# Patient Record
Sex: FEMALE | Race: WHITE | NOT HISPANIC OR LATINO
[De-identification: names, ages, dates, MRNs, and addresses within clinical notes are randomized per-mention and may not be internally consistent; named-entity substitution may affect disease eponyms.]

---

## 2024-04-04 ENCOUNTER — APPOINTMENT (OUTPATIENT)
Dept: GASTROENTEROLOGY | Facility: CLINIC | Age: 79
End: 2024-04-04
Payer: MEDICARE

## 2024-04-04 VITALS
WEIGHT: 138 LBS | HEART RATE: 69 BPM | HEIGHT: 67 IN | OXYGEN SATURATION: 97 % | SYSTOLIC BLOOD PRESSURE: 130 MMHG | DIASTOLIC BLOOD PRESSURE: 75 MMHG | BODY MASS INDEX: 21.66 KG/M2

## 2024-04-04 DIAGNOSIS — R19.4 CHANGE IN BOWEL HABIT: ICD-10-CM

## 2024-04-04 DIAGNOSIS — R10.13 EPIGASTRIC PAIN: ICD-10-CM

## 2024-04-04 PROCEDURE — 99214 OFFICE O/P EST MOD 30 MIN: CPT

## 2024-04-04 RX ORDER — CEPHALEXIN 500 MG/1
500 CAPSULE ORAL 3 TIMES DAILY
Qty: 30 | Refills: 1 | Status: ACTIVE | COMMUNITY
Start: 2024-04-04 | End: 1900-01-01

## 2024-04-04 NOTE — ASSESSMENT
[FreeTextEntry1] : Patient with llq and rlq pain, acute colonic infection low residue diet  oral antibiotic therapy

## 2024-04-04 NOTE — PHYSICAL EXAM

## 2024-04-26 DIAGNOSIS — K57.32 DIVERTICULITIS OF LARGE INTESTINE W/OUT PERFORATION OR ABSCESS W/OUT BLEEDING: ICD-10-CM

## 2024-04-26 RX ORDER — FAMOTIDINE 40 MG/1
40 TABLET, FILM COATED ORAL
Qty: 30 | Refills: 3 | Status: ACTIVE | COMMUNITY
Start: 2024-04-26 | End: 1900-01-01

## 2024-04-26 RX ORDER — CEPHALEXIN 500 MG/1
500 CAPSULE ORAL 3 TIMES DAILY
Qty: 21 | Refills: 1 | Status: ACTIVE | COMMUNITY
Start: 2024-04-26 | End: 1900-01-01

## 2024-05-22 ENCOUNTER — APPOINTMENT (OUTPATIENT)
Dept: GASTROENTEROLOGY | Facility: GI CENTER | Age: 79
End: 2024-05-22

## 2024-05-28 ENCOUNTER — APPOINTMENT (OUTPATIENT)
Dept: GASTROENTEROLOGY | Facility: GI CENTER | Age: 79
End: 2024-05-28

## 2024-07-03 ENCOUNTER — INPATIENT (INPATIENT)
Facility: HOSPITAL | Age: 79
LOS: 1 days | Discharge: HOME | End: 2024-07-05
Attending: INTERNAL MEDICINE | Admitting: INTERNAL MEDICINE
Payer: MEDICARE

## 2024-07-03 VITALS
HEIGHT: 67 IN | DIASTOLIC BLOOD PRESSURE: 76 MMHG | SYSTOLIC BLOOD PRESSURE: 153 MMHG | RESPIRATION RATE: 18 BRPM | OXYGEN SATURATION: 99 % | WEIGHT: 139.99 LBS | HEART RATE: 75 BPM | TEMPERATURE: 98 F

## 2024-07-03 LAB
ALBUMIN SERPL ELPH-MCNC: 3.4 G/DL — SIGNIFICANT CHANGE UP (ref 3.3–5)
ALP SERPL-CCNC: 76 U/L — SIGNIFICANT CHANGE UP (ref 40–120)
ALT FLD-CCNC: 36 U/L — SIGNIFICANT CHANGE UP (ref 12–78)
ANION GAP SERPL CALC-SCNC: 7 MMOL/L — SIGNIFICANT CHANGE UP (ref 5–17)
APTT BLD: 43.9 SEC — HIGH (ref 24.5–35.6)
AST SERPL-CCNC: 21 U/L — SIGNIFICANT CHANGE UP (ref 15–37)
BASOPHILS # BLD AUTO: 0.04 K/UL — SIGNIFICANT CHANGE UP (ref 0–0.2)
BASOPHILS NFR BLD AUTO: 0.4 % — SIGNIFICANT CHANGE UP (ref 0–2)
BILIRUB SERPL-MCNC: 1 MG/DL — SIGNIFICANT CHANGE UP (ref 0.2–1.2)
BUN SERPL-MCNC: 27 MG/DL — HIGH (ref 7–23)
CALCIUM SERPL-MCNC: 9.2 MG/DL — SIGNIFICANT CHANGE UP (ref 8.5–10.1)
CHLORIDE SERPL-SCNC: 104 MMOL/L — SIGNIFICANT CHANGE UP (ref 96–108)
CO2 SERPL-SCNC: 28 MMOL/L — SIGNIFICANT CHANGE UP (ref 22–31)
CREAT SERPL-MCNC: 0.87 MG/DL — SIGNIFICANT CHANGE UP (ref 0.5–1.3)
EGFR: 68 ML/MIN/1.73M2 — SIGNIFICANT CHANGE UP
EOSINOPHIL # BLD AUTO: 0.03 K/UL — SIGNIFICANT CHANGE UP (ref 0–0.5)
EOSINOPHIL NFR BLD AUTO: 0.3 % — SIGNIFICANT CHANGE UP (ref 0–6)
GLUCOSE SERPL-MCNC: 114 MG/DL — HIGH (ref 70–99)
HCT VFR BLD CALC: 37.8 % — SIGNIFICANT CHANGE UP (ref 34.5–45)
HGB BLD-MCNC: 12.2 G/DL — SIGNIFICANT CHANGE UP (ref 11.5–15.5)
IMM GRANULOCYTES NFR BLD AUTO: 0.3 % — SIGNIFICANT CHANGE UP (ref 0–0.9)
INR BLD: 2.15 RATIO — HIGH (ref 0.85–1.18)
LACTATE SERPL-SCNC: 0.6 MMOL/L — LOW (ref 0.7–2)
LYMPHOCYTES # BLD AUTO: 1.24 K/UL — SIGNIFICANT CHANGE UP (ref 1–3.3)
LYMPHOCYTES # BLD AUTO: 11.5 % — LOW (ref 13–44)
MCHC RBC-ENTMCNC: 29.8 PG — SIGNIFICANT CHANGE UP (ref 27–34)
MCHC RBC-ENTMCNC: 32.3 G/DL — SIGNIFICANT CHANGE UP (ref 32–36)
MCV RBC AUTO: 92.2 FL — SIGNIFICANT CHANGE UP (ref 80–100)
MONOCYTES # BLD AUTO: 1.31 K/UL — HIGH (ref 0–0.9)
MONOCYTES NFR BLD AUTO: 12.2 % — SIGNIFICANT CHANGE UP (ref 2–14)
NEUTROPHILS # BLD AUTO: 8.12 K/UL — HIGH (ref 1.8–7.4)
NEUTROPHILS NFR BLD AUTO: 75.3 % — SIGNIFICANT CHANGE UP (ref 43–77)
NRBC # BLD: 0 /100 WBCS — SIGNIFICANT CHANGE UP (ref 0–0)
PLATELET # BLD AUTO: 201 K/UL — SIGNIFICANT CHANGE UP (ref 150–400)
POTASSIUM SERPL-MCNC: 3.3 MMOL/L — LOW (ref 3.5–5.3)
POTASSIUM SERPL-SCNC: 3.3 MMOL/L — LOW (ref 3.5–5.3)
PROT SERPL-MCNC: 7.1 GM/DL — SIGNIFICANT CHANGE UP (ref 6–8.3)
PROTHROM AB SERPL-ACNC: 25 SEC — HIGH (ref 9.5–13)
RBC # BLD: 4.1 M/UL — SIGNIFICANT CHANGE UP (ref 3.8–5.2)
RBC # FLD: 12.7 % — SIGNIFICANT CHANGE UP (ref 10.3–14.5)
SODIUM SERPL-SCNC: 139 MMOL/L — SIGNIFICANT CHANGE UP (ref 135–145)
WBC # BLD: 10.77 K/UL — HIGH (ref 3.8–10.5)
WBC # FLD AUTO: 10.77 K/UL — HIGH (ref 3.8–10.5)

## 2024-07-03 PROCEDURE — 99285 EMERGENCY DEPT VISIT HI MDM: CPT | Mod: FS

## 2024-07-03 PROCEDURE — 99223 1ST HOSP IP/OBS HIGH 75: CPT

## 2024-07-03 PROCEDURE — 93010 ELECTROCARDIOGRAM REPORT: CPT

## 2024-07-03 RX ORDER — RIVAROXABAN 10 MG/1
1 TABLET, FILM COATED ORAL
Refills: 0 | DISCHARGE

## 2024-07-03 RX ORDER — ATORVASTATIN CALCIUM 20 MG/1
80 TABLET, FILM COATED ORAL AT BEDTIME
Refills: 0 | Status: DISCONTINUED | OUTPATIENT
Start: 2024-07-03 | End: 2024-07-05

## 2024-07-03 RX ORDER — RIVAROXABAN 10 MG/1
20 TABLET, FILM COATED ORAL
Refills: 0 | Status: DISCONTINUED | OUTPATIENT
Start: 2024-07-04 | End: 2024-07-05

## 2024-07-03 RX ORDER — ACETAMINOPHEN 325 MG
650 TABLET ORAL EVERY 6 HOURS
Refills: 0 | Status: DISCONTINUED | OUTPATIENT
Start: 2024-07-03 | End: 2024-07-05

## 2024-07-03 RX ORDER — AMIODARONE HYDROCHLORIDE 50 MG/ML
200 INJECTION, SOLUTION INTRAVENOUS DAILY
Refills: 0 | Status: DISCONTINUED | OUTPATIENT
Start: 2024-07-03 | End: 2024-07-05

## 2024-07-03 RX ORDER — AMLODIPINE BESYLATE 2.5 MG/1
5 TABLET ORAL DAILY
Refills: 0 | Status: DISCONTINUED | OUTPATIENT
Start: 2024-07-03 | End: 2024-07-05

## 2024-07-03 RX ORDER — ATORVASTATIN CALCIUM 20 MG/1
1 TABLET, FILM COATED ORAL
Refills: 0 | DISCHARGE

## 2024-07-03 RX ORDER — LEVOTHYROXINE SODIUM 25 MCG
1 TABLET ORAL
Refills: 0 | DISCHARGE

## 2024-07-03 RX ORDER — DEXTROSE MONOHYDRATE AND SODIUM CHLORIDE 5; .3 G/100ML; G/100ML
2000 INJECTION, SOLUTION INTRAVENOUS ONCE
Refills: 0 | Status: COMPLETED | OUTPATIENT
Start: 2024-07-03 | End: 2024-07-03

## 2024-07-03 RX ORDER — AMLODIPINE BESYLATE 2.5 MG/1
1 TABLET ORAL
Refills: 0 | DISCHARGE

## 2024-07-03 RX ORDER — AMIODARONE HYDROCHLORIDE 50 MG/ML
1 INJECTION, SOLUTION INTRAVENOUS
Refills: 0 | DISCHARGE

## 2024-07-03 RX ORDER — CLOPIDOGREL BISULFATE 75 MG/1
75 TABLET, FILM COATED ORAL DAILY
Refills: 0 | Status: DISCONTINUED | OUTPATIENT
Start: 2024-07-03 | End: 2024-07-05

## 2024-07-03 RX ORDER — CLOPIDOGREL BISULFATE 75 MG/1
1 TABLET, FILM COATED ORAL
Refills: 0 | DISCHARGE

## 2024-07-03 RX ORDER — POTASSIUM CHLORIDE 600 MG/1
40 TABLET, FILM COATED, EXTENDED RELEASE ORAL ONCE
Refills: 0 | Status: COMPLETED | OUTPATIENT
Start: 2024-07-03 | End: 2024-07-03

## 2024-07-03 RX ORDER — FAMOTIDINE 40 MG
1 TABLET ORAL
Refills: 0 | DISCHARGE

## 2024-07-03 RX ORDER — ONDANSETRON HYDROCHLORIDE 2 MG/ML
4 INJECTION INTRAMUSCULAR; INTRAVENOUS EVERY 8 HOURS
Refills: 0 | Status: DISCONTINUED | OUTPATIENT
Start: 2024-07-03 | End: 2024-07-05

## 2024-07-03 RX ORDER — FAMOTIDINE 40 MG
20 TABLET ORAL DAILY
Refills: 0 | Status: DISCONTINUED | OUTPATIENT
Start: 2024-07-03 | End: 2024-07-05

## 2024-07-03 RX ORDER — PIPERACILLIN SODIUM AND TAZOBACTAM SODIUM 3; .375 G/15ML; G/15ML
3.38 INJECTION, POWDER, LYOPHILIZED, FOR SOLUTION INTRAVENOUS EVERY 8 HOURS
Refills: 0 | Status: DISCONTINUED | OUTPATIENT
Start: 2024-07-03 | End: 2024-07-05

## 2024-07-03 RX ORDER — MAGNESIUM, ALUMINUM HYDROXIDE 400-400
30 TABLET,CHEWABLE ORAL EVERY 4 HOURS
Refills: 0 | Status: DISCONTINUED | OUTPATIENT
Start: 2024-07-03 | End: 2024-07-05

## 2024-07-03 RX ORDER — PIPERACILLIN SODIUM AND TAZOBACTAM SODIUM 3; .375 G/15ML; G/15ML
3.38 INJECTION, POWDER, LYOPHILIZED, FOR SOLUTION INTRAVENOUS ONCE
Refills: 0 | Status: COMPLETED | OUTPATIENT
Start: 2024-07-03 | End: 2024-07-03

## 2024-07-03 RX ORDER — VANCOMYCIN HYDROCHLORIDE 50 MG/ML
1000 KIT ORAL ONCE
Refills: 0 | Status: COMPLETED | OUTPATIENT
Start: 2024-07-03 | End: 2024-07-03

## 2024-07-03 RX ADMIN — DEXTROSE MONOHYDRATE AND SODIUM CHLORIDE 2000 MILLILITER(S): 5; .3 INJECTION, SOLUTION INTRAVENOUS at 23:41

## 2024-07-03 RX ADMIN — POTASSIUM CHLORIDE 40 MILLIEQUIVALENT(S): 600 TABLET, FILM COATED, EXTENDED RELEASE ORAL at 22:08

## 2024-07-03 RX ADMIN — PIPERACILLIN SODIUM AND TAZOBACTAM SODIUM 200 GRAM(S): 3; .375 INJECTION, POWDER, LYOPHILIZED, FOR SOLUTION INTRAVENOUS at 22:11

## 2024-07-03 RX ADMIN — VANCOMYCIN HYDROCHLORIDE 250 MILLIGRAM(S): KIT at 23:45

## 2024-07-04 DIAGNOSIS — I63.9 CEREBRAL INFARCTION, UNSPECIFIED: ICD-10-CM

## 2024-07-04 DIAGNOSIS — I10 ESSENTIAL (PRIMARY) HYPERTENSION: ICD-10-CM

## 2024-07-04 DIAGNOSIS — E87.6 HYPOKALEMIA: ICD-10-CM

## 2024-07-04 DIAGNOSIS — L03.119 CELLULITIS OF UNSPECIFIED PART OF LIMB: ICD-10-CM

## 2024-07-04 DIAGNOSIS — I48.91 UNSPECIFIED ATRIAL FIBRILLATION: ICD-10-CM

## 2024-07-04 LAB
A1C WITH ESTIMATED AVERAGE GLUCOSE RESULT: 5.7 % — HIGH (ref 4–5.6)
ALBUMIN SERPL ELPH-MCNC: 2.8 G/DL — LOW (ref 3.3–5)
ALP SERPL-CCNC: 63 U/L — SIGNIFICANT CHANGE UP (ref 40–120)
ALT FLD-CCNC: 28 U/L — SIGNIFICANT CHANGE UP (ref 12–78)
ANION GAP SERPL CALC-SCNC: 5 MMOL/L — SIGNIFICANT CHANGE UP (ref 5–17)
AST SERPL-CCNC: 14 U/L — LOW (ref 15–37)
BILIRUB SERPL-MCNC: 0.9 MG/DL — SIGNIFICANT CHANGE UP (ref 0.2–1.2)
BUN SERPL-MCNC: 15 MG/DL — SIGNIFICANT CHANGE UP (ref 7–23)
CALCIUM SERPL-MCNC: 8.2 MG/DL — LOW (ref 8.5–10.1)
CHLORIDE SERPL-SCNC: 107 MMOL/L — SIGNIFICANT CHANGE UP (ref 96–108)
CHOLEST SERPL-MCNC: 177 MG/DL — SIGNIFICANT CHANGE UP
CO2 SERPL-SCNC: 26 MMOL/L — SIGNIFICANT CHANGE UP (ref 22–31)
CREAT SERPL-MCNC: 0.72 MG/DL — SIGNIFICANT CHANGE UP (ref 0.5–1.3)
CRP SERPL-MCNC: 94 MG/L — HIGH
EGFR: 85 ML/MIN/1.73M2 — SIGNIFICANT CHANGE UP
ESTIMATED AVERAGE GLUCOSE: 117 MG/DL — HIGH (ref 68–114)
GLUCOSE SERPL-MCNC: 105 MG/DL — HIGH (ref 70–99)
HCT VFR BLD CALC: 34 % — LOW (ref 34.5–45)
HDLC SERPL-MCNC: 98 MG/DL — SIGNIFICANT CHANGE UP
HGB BLD-MCNC: 11 G/DL — LOW (ref 11.5–15.5)
LIPID PNL WITH DIRECT LDL SERPL: 70 MG/DL — SIGNIFICANT CHANGE UP
MCHC RBC-ENTMCNC: 29.9 PG — SIGNIFICANT CHANGE UP (ref 27–34)
MCHC RBC-ENTMCNC: 32.4 G/DL — SIGNIFICANT CHANGE UP (ref 32–36)
MCV RBC AUTO: 92.4 FL — SIGNIFICANT CHANGE UP (ref 80–100)
NON HDL CHOLESTEROL: 79 MG/DL — SIGNIFICANT CHANGE UP
NRBC # BLD: 0 /100 WBCS — SIGNIFICANT CHANGE UP (ref 0–0)
PLATELET # BLD AUTO: 179 K/UL — SIGNIFICANT CHANGE UP (ref 150–400)
POTASSIUM SERPL-MCNC: 3.8 MMOL/L — SIGNIFICANT CHANGE UP (ref 3.5–5.3)
POTASSIUM SERPL-SCNC: 3.8 MMOL/L — SIGNIFICANT CHANGE UP (ref 3.5–5.3)
PROT SERPL-MCNC: 5.8 GM/DL — LOW (ref 6–8.3)
RBC # BLD: 3.68 M/UL — LOW (ref 3.8–5.2)
RBC # FLD: 12.8 % — SIGNIFICANT CHANGE UP (ref 10.3–14.5)
SODIUM SERPL-SCNC: 138 MMOL/L — SIGNIFICANT CHANGE UP (ref 135–145)
TRIGL SERPL-MCNC: 42 MG/DL — SIGNIFICANT CHANGE UP
WBC # BLD: 10.62 K/UL — HIGH (ref 3.8–10.5)
WBC # FLD AUTO: 10.62 K/UL — HIGH (ref 3.8–10.5)

## 2024-07-04 PROCEDURE — 93970 EXTREMITY STUDY: CPT | Mod: 26

## 2024-07-04 PROCEDURE — 99232 SBSQ HOSP IP/OBS MODERATE 35: CPT

## 2024-07-04 RX ADMIN — AMIODARONE HYDROCHLORIDE 200 MILLIGRAM(S): 50 INJECTION, SOLUTION INTRAVENOUS at 06:41

## 2024-07-04 RX ADMIN — CLOPIDOGREL BISULFATE 75 MILLIGRAM(S): 75 TABLET, FILM COATED ORAL at 12:39

## 2024-07-04 RX ADMIN — PIPERACILLIN SODIUM AND TAZOBACTAM SODIUM 25 GRAM(S): 3; .375 INJECTION, POWDER, LYOPHILIZED, FOR SOLUTION INTRAVENOUS at 15:01

## 2024-07-04 RX ADMIN — AMLODIPINE BESYLATE 5 MILLIGRAM(S): 2.5 TABLET ORAL at 06:41

## 2024-07-04 RX ADMIN — Medication 20 MILLIGRAM(S): at 12:39

## 2024-07-04 RX ADMIN — PIPERACILLIN SODIUM AND TAZOBACTAM SODIUM 25 GRAM(S): 3; .375 INJECTION, POWDER, LYOPHILIZED, FOR SOLUTION INTRAVENOUS at 06:41

## 2024-07-04 RX ADMIN — PIPERACILLIN SODIUM AND TAZOBACTAM SODIUM 25 GRAM(S): 3; .375 INJECTION, POWDER, LYOPHILIZED, FOR SOLUTION INTRAVENOUS at 21:26

## 2024-07-04 RX ADMIN — RIVAROXABAN 20 MILLIGRAM(S): 10 TABLET, FILM COATED ORAL at 18:29

## 2024-07-04 RX ADMIN — ATORVASTATIN CALCIUM 80 MILLIGRAM(S): 20 TABLET, FILM COATED ORAL at 21:25

## 2024-07-05 ENCOUNTER — TRANSCRIPTION ENCOUNTER (OUTPATIENT)
Age: 79
End: 2024-07-05

## 2024-07-05 VITALS
RESPIRATION RATE: 17 BRPM | DIASTOLIC BLOOD PRESSURE: 63 MMHG | OXYGEN SATURATION: 97 % | SYSTOLIC BLOOD PRESSURE: 114 MMHG | HEART RATE: 65 BPM

## 2024-07-05 PROCEDURE — 99239 HOSP IP/OBS DSCHRG MGMT >30: CPT

## 2024-07-05 PROCEDURE — 99222 1ST HOSP IP/OBS MODERATE 55: CPT

## 2024-07-05 RX ORDER — SULFAMETHOXAZOLE AND TRIMETHOPRIM 800; 160 MG/1; MG/1
1 TABLET ORAL
Qty: 14 | Refills: 0
Start: 2024-07-05 | End: 2024-07-11

## 2024-07-05 RX ORDER — VANCOMYCIN HYDROCHLORIDE 50 MG/ML
750 KIT ORAL EVERY 12 HOURS
Refills: 0 | Status: DISCONTINUED | OUTPATIENT
Start: 2024-07-05 | End: 2024-07-05

## 2024-07-05 RX ADMIN — AMIODARONE HYDROCHLORIDE 200 MILLIGRAM(S): 50 INJECTION, SOLUTION INTRAVENOUS at 05:42

## 2024-07-05 RX ADMIN — AMLODIPINE BESYLATE 5 MILLIGRAM(S): 2.5 TABLET ORAL at 05:42

## 2024-07-05 RX ADMIN — PIPERACILLIN SODIUM AND TAZOBACTAM SODIUM 25 GRAM(S): 3; .375 INJECTION, POWDER, LYOPHILIZED, FOR SOLUTION INTRAVENOUS at 05:42

## 2024-07-05 RX ADMIN — VANCOMYCIN HYDROCHLORIDE 250 MILLIGRAM(S): KIT at 13:34

## 2024-07-05 RX ADMIN — CLOPIDOGREL BISULFATE 75 MILLIGRAM(S): 75 TABLET, FILM COATED ORAL at 11:47

## 2024-07-05 RX ADMIN — Medication 20 MILLIGRAM(S): at 11:47

## 2024-07-09 LAB
CULTURE RESULTS: SIGNIFICANT CHANGE UP
CULTURE RESULTS: SIGNIFICANT CHANGE UP
SPECIMEN SOURCE: SIGNIFICANT CHANGE UP
SPECIMEN SOURCE: SIGNIFICANT CHANGE UP

## 2024-07-11 DIAGNOSIS — Z79.01 LONG TERM (CURRENT) USE OF ANTICOAGULANTS: ICD-10-CM

## 2024-07-11 DIAGNOSIS — R73.03 PREDIABETES: ICD-10-CM

## 2024-07-11 DIAGNOSIS — L03.116 CELLULITIS OF LEFT LOWER LIMB: ICD-10-CM

## 2024-07-11 DIAGNOSIS — E87.6 HYPOKALEMIA: ICD-10-CM

## 2024-07-11 DIAGNOSIS — Z79.890 HORMONE REPLACEMENT THERAPY: ICD-10-CM

## 2024-07-11 DIAGNOSIS — I34.0 NONRHEUMATIC MITRAL (VALVE) INSUFFICIENCY: ICD-10-CM

## 2024-07-11 DIAGNOSIS — I48.20 CHRONIC ATRIAL FIBRILLATION, UNSPECIFIED: ICD-10-CM

## 2024-07-11 DIAGNOSIS — Z79.02 LONG TERM (CURRENT) USE OF ANTITHROMBOTICS/ANTIPLATELETS: ICD-10-CM

## 2024-07-11 DIAGNOSIS — I10 ESSENTIAL (PRIMARY) HYPERTENSION: ICD-10-CM

## 2024-07-11 DIAGNOSIS — E78.5 HYPERLIPIDEMIA, UNSPECIFIED: ICD-10-CM

## 2024-07-11 DIAGNOSIS — D63.8 ANEMIA IN OTHER CHRONIC DISEASES CLASSIFIED ELSEWHERE: ICD-10-CM

## 2024-07-11 DIAGNOSIS — E89.0 POSTPROCEDURAL HYPOTHYROIDISM: ICD-10-CM

## 2024-07-11 DIAGNOSIS — Z79.899 OTHER LONG TERM (CURRENT) DRUG THERAPY: ICD-10-CM

## 2024-07-11 DIAGNOSIS — Z86.73 PERSONAL HISTORY OF TRANSIENT ISCHEMIC ATTACK (TIA), AND CEREBRAL INFARCTION WITHOUT RESIDUAL DEFICITS: ICD-10-CM

## 2024-07-11 DIAGNOSIS — I34.1 NONRHEUMATIC MITRAL (VALVE) PROLAPSE: ICD-10-CM

## 2024-10-09 ENCOUNTER — EMERGENCY (EMERGENCY)
Facility: HOSPITAL | Age: 79
LOS: 0 days | Discharge: HOME | End: 2024-10-10
Attending: EMERGENCY MEDICINE
Payer: MEDICARE

## 2024-10-09 VITALS
HEIGHT: 67 IN | HEART RATE: 76 BPM | DIASTOLIC BLOOD PRESSURE: 74 MMHG | OXYGEN SATURATION: 96 % | SYSTOLIC BLOOD PRESSURE: 128 MMHG | TEMPERATURE: 98 F | WEIGHT: 139.99 LBS | RESPIRATION RATE: 18 BRPM

## 2024-10-09 DIAGNOSIS — I48.91 UNSPECIFIED ATRIAL FIBRILLATION: ICD-10-CM
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
## 2024-10-09 DIAGNOSIS — L03.115 CELLULITIS OF RIGHT LOWER LIMB: ICD-10-CM

## 2024-10-09 DIAGNOSIS — I10 ESSENTIAL (PRIMARY) HYPERTENSION: ICD-10-CM

## 2024-10-09 DIAGNOSIS — Z90.09 ACQUIRED ABSENCE OF OTHER PART OF HEAD AND NECK: ICD-10-CM

## 2024-10-09 DIAGNOSIS — S30.1XXA CONTUSION OF ABDOMINAL WALL, INITIAL ENCOUNTER: ICD-10-CM

## 2024-10-09 DIAGNOSIS — Z79.01 LONG TERM (CURRENT) USE OF ANTICOAGULANTS: ICD-10-CM

## 2024-10-09 DIAGNOSIS — E78.5 HYPERLIPIDEMIA, UNSPECIFIED: ICD-10-CM

## 2024-10-09 DIAGNOSIS — M79.89 OTHER SPECIFIED SOFT TISSUE DISORDERS: ICD-10-CM

## 2024-10-09 DIAGNOSIS — Z79.02 LONG TERM (CURRENT) USE OF ANTITHROMBOTICS/ANTIPLATELETS: ICD-10-CM

## 2024-10-09 DIAGNOSIS — Z86.73 PERSONAL HISTORY OF TRANSIENT ISCHEMIC ATTACK (TIA), AND CEREBRAL INFARCTION WITHOUT RESIDUAL DEFICITS: ICD-10-CM

## 2024-10-09 DIAGNOSIS — Z87.891 PERSONAL HISTORY OF NICOTINE DEPENDENCE: ICD-10-CM
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
## 2024-10-09 PROBLEM — I63.9 CEREBRAL INFARCTION, UNSPECIFIED: Chronic | Status: ACTIVE | Noted: 2024-07-03

## 2024-10-09 LAB
ALBUMIN SERPL ELPH-MCNC: 3.3 G/DL — SIGNIFICANT CHANGE UP (ref 3.3–5)
ALP SERPL-CCNC: 94 U/L — SIGNIFICANT CHANGE UP (ref 40–120)
ALT FLD-CCNC: 54 U/L — SIGNIFICANT CHANGE UP (ref 12–78)
ANION GAP SERPL CALC-SCNC: 8 MMOL/L — SIGNIFICANT CHANGE UP (ref 5–17)
APTT BLD: 41 SEC — HIGH (ref 24.5–35.6)
AST SERPL-CCNC: 22 U/L — SIGNIFICANT CHANGE UP (ref 15–37)
BASOPHILS # BLD AUTO: 0.04 K/UL — SIGNIFICANT CHANGE UP (ref 0–0.2)
BASOPHILS NFR BLD AUTO: 0.7 % — SIGNIFICANT CHANGE UP (ref 0–2)
BILIRUB SERPL-MCNC: 1.2 MG/DL — SIGNIFICANT CHANGE UP (ref 0.2–1.2)
BUN SERPL-MCNC: 24 MG/DL — HIGH (ref 7–23)
CALCIUM SERPL-MCNC: 9 MG/DL — SIGNIFICANT CHANGE UP (ref 8.5–10.1)
CHLORIDE SERPL-SCNC: 107 MMOL/L — SIGNIFICANT CHANGE UP (ref 96–108)
CO2 SERPL-SCNC: 26 MMOL/L — SIGNIFICANT CHANGE UP (ref 22–31)
CREAT SERPL-MCNC: 0.72 MG/DL — SIGNIFICANT CHANGE UP (ref 0.5–1.3)
EGFR: 85 ML/MIN/1.73M2 — SIGNIFICANT CHANGE UP
EOSINOPHIL # BLD AUTO: 0.14 K/UL — SIGNIFICANT CHANGE UP (ref 0–0.5)
EOSINOPHIL NFR BLD AUTO: 2.3 % — SIGNIFICANT CHANGE UP (ref 0–6)
ERYTHROCYTE [SEDIMENTATION RATE] IN BLOOD: 7 MM/HR — SIGNIFICANT CHANGE UP (ref 0–20)
GLUCOSE SERPL-MCNC: 89 MG/DL — SIGNIFICANT CHANGE UP (ref 70–99)
HCT VFR BLD CALC: 31.1 % — LOW (ref 34.5–45)
HGB BLD-MCNC: 9.7 G/DL — LOW (ref 11.5–15.5)
IMM GRANULOCYTES NFR BLD AUTO: 0.3 % — SIGNIFICANT CHANGE UP (ref 0–0.9)
INR BLD: 1.98 RATIO — HIGH (ref 0.85–1.16)
LACTATE SERPL-SCNC: 1.3 MMOL/L — SIGNIFICANT CHANGE UP (ref 0.7–2)
LYMPHOCYTES # BLD AUTO: 1.18 K/UL — SIGNIFICANT CHANGE UP (ref 1–3.3)
LYMPHOCYTES # BLD AUTO: 19.8 % — SIGNIFICANT CHANGE UP (ref 13–44)
MCHC RBC-ENTMCNC: 28.4 PG — SIGNIFICANT CHANGE UP (ref 27–34)
MCHC RBC-ENTMCNC: 31.2 G/DL — LOW (ref 32–36)
MCV RBC AUTO: 90.9 FL — SIGNIFICANT CHANGE UP (ref 80–100)
MONOCYTES # BLD AUTO: 1.03 K/UL — HIGH (ref 0–0.9)
MONOCYTES NFR BLD AUTO: 17.3 % — HIGH (ref 2–14)
NEUTROPHILS # BLD AUTO: 3.55 K/UL — SIGNIFICANT CHANGE UP (ref 1.8–7.4)
NEUTROPHILS NFR BLD AUTO: 59.6 % — SIGNIFICANT CHANGE UP (ref 43–77)
NRBC # BLD: 0 /100 WBCS — SIGNIFICANT CHANGE UP (ref 0–0)
PLATELET # BLD AUTO: 195 K/UL — SIGNIFICANT CHANGE UP (ref 150–400)
POTASSIUM SERPL-MCNC: 4.1 MMOL/L — SIGNIFICANT CHANGE UP (ref 3.5–5.3)
POTASSIUM SERPL-SCNC: 4.1 MMOL/L — SIGNIFICANT CHANGE UP (ref 3.5–5.3)
PROT SERPL-MCNC: 6.4 GM/DL — SIGNIFICANT CHANGE UP (ref 6–8.3)
PROTHROM AB SERPL-ACNC: 22.8 SEC — HIGH (ref 9.9–13.4)
RBC # BLD: 3.42 M/UL — LOW (ref 3.8–5.2)
RBC # FLD: 15.1 % — HIGH (ref 10.3–14.5)
SODIUM SERPL-SCNC: 141 MMOL/L — SIGNIFICANT CHANGE UP (ref 135–145)
WBC # BLD: 5.96 K/UL — SIGNIFICANT CHANGE UP (ref 3.8–10.5)
WBC # FLD AUTO: 5.96 K/UL — SIGNIFICANT CHANGE UP (ref 3.8–10.5)

## 2024-10-09 PROCEDURE — 93010 ELECTROCARDIOGRAM REPORT: CPT

## 2024-10-09 PROCEDURE — 75635 CT ANGIO ABDOMINAL ARTERIES: CPT | Mod: 26,MC

## 2024-10-09 PROCEDURE — 93971 EXTREMITY STUDY: CPT | Mod: 26,RT

## 2024-10-09 PROCEDURE — 99285 EMERGENCY DEPT VISIT HI MDM: CPT

## 2024-10-09 RX ORDER — SODIUM CHLORIDE IRRIG SOLUTION 0.9 %
2000 SOLUTION, IRRIGATION IRRIGATION ONCE
Refills: 0 | Status: COMPLETED | OUTPATIENT
Start: 2024-10-09 | End: 2024-10-09

## 2024-10-09 RX ORDER — ACETAMINOPHEN 325 MG
1000 TABLET ORAL ONCE
Refills: 0 | Status: COMPLETED | OUTPATIENT
Start: 2024-10-09 | End: 2024-10-09

## 2024-10-09 RX ADMIN — Medication 2000 MILLILITER(S): at 19:42

## 2024-10-09 RX ADMIN — Medication 1000 MILLIGRAM(S): at 19:52

## 2024-10-09 RX ADMIN — Medication 400 MILLIGRAM(S): at 19:27

## 2024-10-09 RX ADMIN — Medication 1 TABLET(S): at 23:57

## 2024-10-09 RX ADMIN — Medication 1000 MILLIGRAM(S): at 20:05

## 2024-10-09 NOTE — ED ADULT NURSE REASSESSMENT NOTE - NS ED NURSE REASSESS COMMENT FT1
Received patient awake and alert from ultrasound, in no apparent distress, daughter at bedside, IV acetaminophen and LR bolus administered as ordered.

## 2024-10-09 NOTE — ED ADULT TRIAGE NOTE - RESPIRATORY RATE (BREATHS/MIN)
Pt requests refills of sertraline and prednisone to [facility].  Pt would like 90 day supply as they will be leaving this weekend (12/3/22) for a few months.  Please advise of ok to refill 90 day quantity.  Last appt with Dr. Silver 9/7/22  
18

## 2024-10-09 NOTE — ED ADULT TRIAGE NOTE - CHIEF COMPLAINT QUOTE
c/o r lower leg swelling since yesterday, worsening with tenderness upon palpation previously treated for cellulitis r leg, seen at [facility] last wednesday for ablation due to svt s/p tia in may on plavix  xarelto amiodarone lipitor hx afib denies shortness of breath or chest pain

## 2024-10-09 NOTE — ED PROVIDER NOTE - PATIENT PORTAL LINK FT
You can access the FollowMyHealth Patient Portal offered by [facility] by registering at the following website: [email or web address]. By joining [facility]’s FollowMyHealth portal, you will also be able to view your health information using other applications (apps) compatible with our system.

## 2024-10-09 NOTE — ED PROVIDER NOTE - PHYSICAL EXAMINATION
Skin- + ecchymosis to the right groin non tender to palp ( as per pt she had ablation and needle insertion to the right groin one week ago at [facility]) + hot to touch to the right lower leg with dark erythema around the distal third of right lower leg Skin- + ecchymosis to the right groin non tender to palp ( as per pt she had ablation and needle insertion to the right groin one week ago at [facility]) + hot to touch to the right lower leg with dark erythema around the distal third of right lower leg dark red mixed with ecchymosis to the right lower third of leg.

## 2024-10-09 NOTE — ED ADULT NURSE NOTE - NSICDXPASTMEDICALHX_GEN_ALL_CORE_FT
PAST MEDICAL HISTORY:  Atrial fibrillation     CVA (cerebrovascular accident)     Hyperlipidemia     Hypertension     Mitral valve prolapse     MR (mitral regurgitation)

## 2024-10-09 NOTE — ED ADULT NURSE NOTE - NS ED NURSE RECORD ANOTHER VITAL SIGN
Arterial Line    Date/Time: 10/12/2020 1:43 PM  Performed by: Chago Ruiz MD  Authorized by: Chago Ruiz MD     Patient Location:  OR  Indication*:  Continuous blood pressure monitoring  Laterality*:  Left  Site*:  Radial  Max Sterile Barrier Technique*:  Sterile gloves, Sterile dressing applied and Cap/Mask  Local Anesthetic:  None  Prep*:  Chlorhexidine gluconate (CHG)  Catheter Size*:  20 G  Catheter Length*:  1 3/4 in  Catheter Type:  Arrow  Ultrasound-Guided*: No    Line Secured*:  Tape and Transparent dressing  Events: patient tolerated procedure well with no complications    Performed By:  Anesthesiologist  Anesthesiologist:  Chago Ruiz MD  Start Time:  10/12/2020 1:40 PM         Yes

## 2024-10-09 NOTE — ED PROVIDER NOTE - CLINICAL SUMMARY MEDICAL DECISION MAKING FREE TEXT BOX
79 years old female here c/o last couple of days right lower leg swelling tender without dizziness, cough, sob, chest pain, fever, chills, nausea, vomiting abd pain. Pt also denies recent hx of trauma, or long traveling. Pt sts she has a hx of atrial fib takes Xarelto 20 mg qd last dose this AM and hx of TIA takes Plavix 75 mg qd last dose this AM. Pt sts she had ablation due to hx of atrial fib one week ago at [facility]. Pt is speaking in clear full sentences appears very  comfortable. Labs including to rule out sepsis and us of right leg to rule out dvt. are ordered. 79 years old female here c/o last couple of days right lower leg swelling tender without dizziness, cough, sob, chest pain, fever, chills, nausea, vomiting abd pain. Pt also denies recent hx of trauma, or long traveling. Pt sts she has a hx of atrial fib takes Xarelto 20 mg qd last dose this AM and hx of TIA takes Plavix 75 mg qd last dose this AM. Pt sts she had ablation due to hx of atrial fib one week ago at [facility]. Pt is speaking in clear full sentences appears very  comfortable. Labs including to rule out sepsis and us of right leg to rule out dvt. are ordered. ekg normal sinus at 66 bpm 79 years old female here c/o last couple of days right lower leg swelling tender without dizziness, cough, sob, chest pain, fever, chills, nausea, vomiting abd pain. Pt also denies recent hx of trauma, or long traveling. Pt sts she has a hx of atrial fib takes Xarelto 20 mg qd last dose this AM and hx of TIA takes Plavix 75 mg qd last dose this AM. Pt sts she had ablation due to hx of atrial fib one week ago at [facility]. Pt is speaking in clear full sentences appears very  comfortable. Labs including to rule out sepsis and us of right leg to rule out dvt. are ordered. ekg normal sinus at 66 bpm  Pt now sts her vascular surgeon told her she had thrombus with pseudoaneurysm and performed injection due to the pseudoaneurysm one week ago. 79 years old female here c/o last couple of days right lower leg swelling tender without dizziness, cough, sob, chest pain, fever, chills, nausea, vomiting abd pain. Pt also denies recent hx of trauma, or long traveling. Pt sts she has a hx of atrial fib takes Xarelto 20 mg qd last dose this AM and hx of TIA takes Plavix 75 mg qd last dose this AM. Pt sts she had ablation due to hx of atrial fib two week ago at [facility]. Pt sts she also had an procedure done to her vascular sx Dr. durbin for right pseudoaneurysm with thrombus  of right leg one week ago  Pt is speaking in clear full sentences appears very  comfortable. Labs including to rule out sepsis and us of right leg to rule out dvt. are ordered. ekg normal sinus at 66 bpm  Pt now sts her vascular surgeon told her she had thrombus with pseudoaneurysm and performed injection due to the pseudoaneurysm one week ago.  discussed with the radiologist about sono of right leg and cta of pelvis and right leg are ordered, discussed the case with ACP of vesicular surgeon. Pt has normal wbc normal lactate and normal esr,  bactrim ds is ordered for right leg cellulitis 79 years old female here c/o last couple of days right lower leg swelling tender without dizziness, cough, sob, chest pain, fever, chills, nausea, vomiting abd pain. Pt also denies recent hx of trauma, or long traveling. Pt sts she has a hx of atrial fib takes Xarelto 20 mg qd last dose this AM and hx of TIA takes Plavix 75 mg qd last dose this AM. Pt sts she had ablation due to hx of atrial fib two week ago at [facility]. Pt sts she also had an procedure done to her vascular sx Dr. durbin for right pseudoaneurysm with thrombus  of right leg one week ago  Pt is speaking in clear full sentences appears very  comfortable. Labs including to rule out sepsis and us of right leg to rule out dvt. are ordered. ekg normal sinus at 66 bpm  Pt now sts her vascular surgeon told her she had thrombus with pseudoaneurysm and performed injection due to the pseudoaneurysm one week ago.  discussed with the radiologist about sono of right leg and cta of pelvis and right leg are ordered, discussed the case with ACP of vesicular surgeon. Pt has normal wbc normal lactate and normal esr,  bactrim ds is ordered for right leg cellulitis  CTangio pelvis and right leg no AV fistula.  progress note

## 2024-10-09 NOTE — ED ADULT NURSE NOTE - NSICDXPASTSURGICALHX_GEN_ALL_CORE_FT
PAST SURGICAL HISTORY:  S/P knee surgery bilateral arthroscopies >10 yrs ago meniscus repair    S/P thyroidectomy partial, 1975

## 2024-10-09 NOTE — ED ADULT TRIAGE NOTE - PAIN RATING/NUMBER SCALE (0-10): ACTIVITY
[address] [place] [address] Rd, [place], [place]     AUTHORIZATION FOR SURGICAL OPERATION OR PROCEDURE    I hereby authorize Dr. Christine Piña DO, my Physician(s) and whomever may be designated as the doctor's Assistant, to perform the fo 4. I consent to the photographing of procedure(s) to be performed for the purposes of advancing medicine, science and/or education, provided my identity is not revealed.  If the procedure has been videotaped, the physician/surgeon will obtain the original v (Witness signature)                                                                                                  (Date)                                (Time)  STATEMENT OF PHYSICIAN My signature below affirms that prior to the time of the procedure;  I 7 (severe pain)

## 2024-10-09 NOTE — ED PROVIDER NOTE - CONSTITUTIONAL, MLM
normal... Well appearing, awake, alert, oriented to person, place, time/situation and in no apparent distress. Speaking in clear full sentences no nasal flaring no shoulders retractions no diaphoresis, smiling pleasant appears very comfortable lying in the stretcher in a bright light room

## 2024-10-09 NOTE — ED ADULT NURSE NOTE - CHIEF COMPLAINT QUOTE
c/o r lower leg swelling since yesterday, worsening with tenderness upon palpation previously treated for cellulitis r leg, seen at [facility] last wednesday for ablation due to svt s/p tia in may on plavix  xarelto amiodarone lipitor hx afib denies shortness of breath or chest pain

## 2024-10-09 NOTE — ED ADULT NURSE NOTE - OBJECTIVE STATEMENT
79yF A&OX3 presenting to ED with R lower leg swelling, hot to touch and tender to palpation. pt report she was seen earlier this year for cellulitis and then subsequently had TIA, which resolved. pt recently underwent ablation procedure for AFIB, is currently taking Xarelto, Plavix 1x day. pt last took meds this morning. pt reports swelling in the R leg started about 3 days ago and has gotten progressively worse over time. pt denies chest pain, sob, dizziness, weakness, blurred vision, N+T, increased work of breathing, abd pain, N+V+D, back pain, h/a, recent fever/cough,  symptoms.

## 2024-10-09 NOTE — ED PROVIDER NOTE - PROGRESS NOTE DETAILS
ACP of vascular were here eval pt., pt is given and explained all test reports and advised to follow up with her vesicular surgeon and pt agreed with discharge. Pt is very pleasant stable to be discharged. acp discussed with her attending vascular surgeon and reviewed the cta of pelvis and right leg sts pt can be discharged.

## 2024-10-09 NOTE — ED PROVIDER NOTE - NONTENDER LOCATION
1
left upper quadrant/right upper quadrant/left lower quadrant/right lower quadrant/periumbilical/umbilical/suprapubic/left costovertebral angle/right costovertebral angle

## 2024-10-09 NOTE — ED ADULT NURSE NOTE - SKIN TURGOR
HR=69 bpm, OMZO=199/101 mmhg, SpO2=97.0 %, Resp=15 B/min, EtCO2=41 mmHg, Apnea=1 Seconds, Temp=36.3 deg C, Pain=0, [place]=10, [place]=2 resilient/elastic

## 2024-10-09 NOTE — ED PROVIDER NOTE - NSFOLLOWUPINSTRUCTIONS_ED_ALL_ED_FT
drink plenty of oral fluid and please follow up with your vascular surgeon and returned if fever, chills, spreading of redness, shortness of breath, chest pain, or unable to tolerate fluid orally

## 2024-10-10 VITALS
RESPIRATION RATE: 18 BRPM | SYSTOLIC BLOOD PRESSURE: 140 MMHG | OXYGEN SATURATION: 100 % | TEMPERATURE: 98 F | DIASTOLIC BLOOD PRESSURE: 73 MMHG | HEART RATE: 64 BPM

## 2024-10-10 LAB — CRP SERPL-MCNC: 5 MG/L — HIGH

## 2024-10-10 PROCEDURE — 99284 EMERGENCY DEPT VISIT MOD MDM: CPT | Mod: FS

## 2024-10-10 NOTE — ED ADULT NURSE REASSESSMENT NOTE - NS ED NURSE REASSESS COMMENT FT1
Patient remains awake and alert, in no apparent distress, daughter at bedside. Awaiting CT Angio results.

## 2024-10-10 NOTE — CONSULT NOTE ADULT - ASSESSMENT
80 y/o female PMHx HTN, HLD, CVA 6/2024, MVP/MR, thyroidectomy, b/l TKA, R ELENA, former smoker, cellulitis of RLE in July, afib s/p ablation 9/25/24 at [facility] c/b right CFA pseudoaneurysm s/p thrombin injection 10/1 presents c/o RLE calf swelling x 2 days.   US: Right CFA pseudoaneurysm with partial thrombosis. Associated AV fistula to the right common femoral vein is not excluded. No acute DVT of the right lower extremity identified.  CTA: Right groin pseudoaneurysm, measuring up to approximately 3 cm in diameter. No evidence for AV fistula. Severe narrowing of the bilateral posterior tibial arteries proximally. Severe narrowing of the right common femoral artery proximally.    Plan:  No acute vascular surgery intervention at this time  Pt should follow up with her vascular physician at [facility]   Discussed with Dr. Trinidad

## 2024-10-10 NOTE — CONSULT NOTE ADULT - CONSULT REASON
US: Right CFA pseudoaneurysm with partial thrombosis. Associated AV fistula to the right common femoral vein is not excluded.

## 2024-10-10 NOTE — CONSULT NOTE ADULT - SUBJECTIVE AND OBJECTIVE BOX
VASCULAR SURGERY CONSULT NOTE    Patient is a 79 year old female who presents with a chief complaint of right leg swelling.     HPI: 78 y/o female PMHx HTN, HLD, CVA 6/2024, MVP/MR, thyroidectomy, b/l TKA, R ELENA, former smoker, cellulitis of RLE in July, afib s/p ablation 9/25/24 at [facility] c/b right CFA pseudoaneurysm s/p thrombin injection 10/1 presents c/o RLE calf swelling x 2 days. Pt seen and examined with daughter present. Denies pain. Reports that ecchymosis down the RLE has been present since the ablation. Reports improvement of the hematoma. Reports brown colored discoloration of b/l shins has been present for a long time. Patient denies fever, chills, nausea, vomiting, constipation, diarrhea, melena, hematochezia, dysuria, hematuria, chest pain, shortness of breath, dizziness, cough.    REVIEW OF SYSTEMS:  CONSTITUTIONAL: No fever, weight loss, or fatigue  EYES: No eye pain, visual disturbances, discharge  ENMT: No difficulty hearing, tinnitus, vertigo; No sinus or throat pain  NECK: No pain or stiffness  BREASTS: No pain, masses, or nipple discharge  RESPIRATORY: No cough, wheezing, chills or hemoptysis; No shortness of breath  CARDIOVASCULAR: No chest pain, palpitations, dizziness, or leg swelling  GASTROINTESTINAL: No abdominal or epigastric pain. No nausea, vomiting, or hematemesis; No diarrhea or constipation. No melena or hematochezia.  GENITOURINARY: No dysuria, frequency, hematuria, or incontinence  NEUROLOGICAL: No headaches, memory loss, loss of strength, numbness, or tremors  SKIN: No itching, burning, rashes, or lesions   LYMPH NODES: No enlarged glands  ENDOCRINE: No heat or cold intolerance; No hair loss  MUSCULOSKELETAL: RLE calf swelling.   PSYCHIATRIC: No depression, anxiety, mood swings, or difficulty sleeping  HEME/LYMPH: No easy bruising, or bleeding gums  ALLERY AND IMMUNOLOGIC: No hives or eczema     PAST MEDICAL & SURGICAL HISTORY:  Mitral valve prolapse  MR (mitral regurgitation)  Atrial fibrillation  CVA (cerebrovascular accident)  Hypertension  Hyperlipidemia  S/P thyroidectomy  partial, 1975  S/P knee surgery  bilateral arthroscopies >10 yrs ago meniscus repair    MEDICATIONS: See outpatient home medication.     Allergies  No Known Allergies    SOCIAL HISTORY: Former smoker many years ago. Denies tobacco, alcohol, illicit drug use.            FAMILY HISTORY: No known family hx.     Vital Signs Last 24 Hrs  T(C): 36.8 (09 Oct 2024 20:06), Max: 36.8 (09 Oct 2024 20:06)  T(F): 98.3 (09 Oct 2024 20:06), Max: 98.3 (09 Oct 2024 20:06)  HR: 75 (09 Oct 2024 20:06) (75 - 76)  BP: 164/74 (09 Oct 2024 20:06) (128/74 - 164/74)  BP(mean): --  RR: 16 (09 Oct 2024 20:06) (16 - 18)  SpO2: 100% (09 Oct 2024 20:06) (96% - 100%)    Parameters below as of 09 Oct 2024 20:06  Patient On (Oxygen Delivery Method): room air    PHYSICAL EXAM:  CONSTITUTIONAL: NAD, well-developed  HEAD:  Atraumatic, Normocephalic  EYES: Conjunctiva and sclera clear  ENMT: No tonsillar erythema, exudates, or enlargement; Moist mucous membranes, No lesions  NECK: Supple, No JVD, Normal thyroid  NERVOUS SYSTEM:  Alert & Oriented X3  RESPIRATORY: Clear to auscultation bilaterally; No rales, rhonchi, wheezing  CARDIOVASCULAR: Regular rate and rhythm. S1S2  GASTROINTESTINAL: Nondistended, +BS, soft, nontender, no guarding, no rigidity   MUSCULOSKELETAL: RLE with calf swelling and with ecchymosis down leg. Brown colored discoloration of b/l shins. +DP and PT pulses b/l. +SILT, warm, compartments soft b/l.     LABS:                        9.7    5.96  )-----------( 195      ( 09 Oct 2024 18:15 )             31.1     10-09    141  |  107  |  24[H]  ----------------------------<  89  4.1   |  26  |  0.72    Ca    9.0      09 Oct 2024 18:15    TPro  6.4  /  Alb  3.3  /  TBili  1.2  /  DBili  x   /  AST  22  /  ALT  54  /  AlkPhos  94  10-09    PT/INR - ( 09 Oct 2024 18:15 )   PT: 22.8 sec;   INR: 1.98 ratio      PTT - ( 09 Oct 2024 18:15 )  PTT:41.0 sec    Urinalysis Basic - ( 09 Oct 2024 18:15 )    Color: x / Appearance: x / SG: x / pH: x  Gluc: 89 mg/dL / Ketone: x  / Bili: x / Urobili: x   Blood: x / Protein: x / Nitrite: x   Leuk Esterase: x / RBC: x / WBC x   Sq Epi: x / Non Sq Epi: x / Bacteria: x    < from: CT Angio Abd Aorta w/run-off w/ IV Cont (10.09.24 @ 22:52) >  FINDINGS:  Aorta: No aortic aneurysm. No aortic dissection.  Celiac trunk and mesenteric arteries: No occlusion or significant   stenosis.  Renal arteries: There is mild atherosclerotic narrowing of the left renal  artery proximally. There is mild atherosclerotic narrowing of the right   renal  artery proximally.  Right iliac arteries: No occlusion or significant stenosis.  Right femoral/popliteal arteries: Right popliteal artery is poorly  evaluated  due to artifact. No obvious occlusion.  Severe narrowing of the right   common  femoral artery proximally.  Right infrapopliteal arteries: There is severe narrowing of the right   posterior  tibial artery proximally.  Left iliac arteries: No occlusion or significant stenosis.  Left femoral/popliteal arteries: Left popliteal artery is poorly   evaluated due  to artifact. No obvious occlusion.  Left femoral artery is patent.  Left infrapopliteal arteries: There is severe narrowing of the left   posterior  tibial artery proximally.    Liver: No mass.  Gallbladder and biliary ducts: Unremarkable. No calcified stones. No   ductal  dilation.  Pancreas: Unremarkable. No mass. No ductal dilation.  Spleen: Normal. No splenomegaly.  Adrenal glands: Normal. No mass.  Kidneys and ureters: Normal. No mass.  Stomach and bowel: Unremarkable. No obstruction. No mucosal thickening.  Appendix: No evidence of appendicitis.  Urinary bladder: Unremarkable. No mass.  Reproductive: Unremarkable as visualized.  Intraperitoneal space: Unremarkable. No free air. No significant fluid  collection.  Lymph nodes: No lymphadenopathy.  Bones/joints: Right hip arthroplasty.  Soft tissues: There is a right groin pseudoaneurysm, measuring   approximately  3.0 x 1.7 cm. This arises from the bifurcation of the common femoral   artery.  Diffuse subcutaneous edema.      IMPRESSION:  1.   Right groin pseudoaneurysm, measuring up to approximately 3 cm in  diameter. No evidence for AV fistula.  2.   Severe narrowing of the bilateral posterior tibial arteries   proximally.  3.   Severe narrowing of the right common femoral artery proximally.    < end of copied text >    < from: US Duplex Venous Lower Ext Ltd, Right (10.09.24 @ 19:28) >    FINDINGS:    At the right groin, there is a partially thrombosed pseudoaneurysm   apparently arising from the common femoral artery, measuring 2.8 x 2.2 cm   with patent component measuring 1.5 x 1.0 cm. The neck measures 1.6 cm in   length and 0.4 cm width. Communication with the right common femoral vein  (AV fistula) cannot be excluded, image 1.41.    Right common femoral vein, femoral vein, and popliteal veins are patent,   with appropriate compressibility. Right calf veins and contralateral   common femoral vein are patent. There is edema of the superficial soft   tissues of the right lower extremity. Correlate clinically.    IMPRESSION:    Right CFA pseudoaneurysm with partial thrombosis. Associated AV fistula   to the right common femoral vein is not excluded, image 1.41. Recommend   multiphase CTA pelvis (precontrast, arterial and delayed phases) extended   through proximal thighs for further evaluation.    No acute DVT of the right lower extremity identified.    < end of copied text >     VASCULAR SURGERY CONSULT NOTE    Patient is a 79 year old female who presents with a chief complaint of right leg swelling.     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 Pt seen and examined with daughter present. Denies pain. Reports that ecchymosis down the RLE has been present since the ablation. Reports brown colored discoloration of b/l shins has been present for a long time. Patient denies fever, chills, nausea, vomiting, constipation, diarrhea, melena, hematochezia, dysuria, hematuria, chest pain, shortness of breath, dizziness, cough.    REVIEW OF SYSTEMS:  CONSTITUTIONAL: No fever, weight loss, or fatigue  EYES: No eye pain, visual disturbances, discharge  ENMT: No difficulty hearing, tinnitus, vertigo; No sinus or throat pain  NECK: No pain or stiffness  BREASTS: No pain, masses, or nipple discharge  RESPIRATORY: No cough, wheezing, chills or hemoptysis; No shortness of breath  CARDIOVASCULAR: No chest pain, palpitations, dizziness, or leg swelling  GASTROINTESTINAL: No abdominal or epigastric pain. No nausea, vomiting, or hematemesis; No diarrhea or constipation. No melena or hematochezia.  GENITOURINARY: No dysuria, frequency, hematuria, or incontinence  NEUROLOGICAL: No headaches, memory loss, loss of strength, numbness, or tremors  SKIN: No itching, burning, rashes, or lesions   LYMPH NODES: No enlarged glands  ENDOCRINE: No heat or cold intolerance; No hair loss  MUSCULOSKELETAL: RLE calf swelling.   PSYCHIATRIC: No depression, anxiety, mood swings, or difficulty sleeping  HEME/LYMPH: No easy bruising, or bleeding gums  ALLERY AND IMMUNOLOGIC: No hives or eczema     PAST MEDICAL & SURGICAL HISTORY:  Mitral valve prolapse  MR (mitral regurgitation)  Atrial fibrillation  CVA (cerebrovascular accident)  Hypertension  Hyperlipidemia  S/P thyroidectomy  partial, 1975  S/P knee surgery  bilateral arthroscopies >10 yrs ago meniscus repair    MEDICATIONS: See outpatient home medication.     Allergies  No Known Allergies    SOCIAL HISTORY: Former smoker many years ago. Denies tobacco, alcohol, illicit drug use.            FAMILY HISTORY: No known family hx.     Vital Signs Last 24 Hrs  T(C): 36.8 (09 Oct 2024 20:06), Max: 36.8 (09 Oct 2024 20:06)  T(F): 98.3 (09 Oct 2024 20:06), Max: 98.3 (09 Oct 2024 20:06)  HR: 75 (09 Oct 2024 20:06) (75 - 76)  BP: 164/74 (09 Oct 2024 20:06) (128/74 - 164/74)  BP(mean): --  RR: 16 (09 Oct 2024 20:06) (16 - 18)  SpO2: 100% (09 Oct 2024 20:06) (96% - 100%)    Parameters below as of 09 Oct 2024 20:06  Patient On (Oxygen Delivery Method): room air    PHYSICAL EXAM:  CONSTITUTIONAL: NAD, well-developed  HEAD:  Atraumatic, Normocephalic  EYES: Conjunctiva and sclera clear  ENMT: No tonsillar erythema, exudates, or enlargement; Moist mucous membranes, No lesions  NECK: Supple, No JVD, Normal thyroid  NERVOUS SYSTEM:  Alert & Oriented X3  RESPIRATORY: Clear to auscultation bilaterally; No rales, rhonchi, wheezing  CARDIOVASCULAR: Regular rate and rhythm. S1S2  GASTROINTESTINAL: Nondistended, +BS, soft, nontender, no guarding, no rigidity   MUSCULOSKELETAL: RLE with calf swelling and with ecchymosis down leg. Brown colored discoloration of b/l shins. +DP and PT pulses b/l. +SILT, warm, compartments soft b/l.     LABS:                        9.7    5.96  )-----------( 195      ( 09 Oct 2024 18:15 )             31.1     10-09    141  |  107  |  24[H]  ----------------------------<  89  4.1   |  26  |  0.72    Ca    9.0      09 Oct 2024 18:15    TPro  6.4  /  Alb  3.3  /  TBili  1.2  /  DBili  x   /  AST  22  /  ALT  54  /  AlkPhos  94  10-09    PT/INR - ( 09 Oct 2024 18:15 )   PT: 22.8 sec;   INR: 1.98 ratio      PTT - ( 09 Oct 2024 18:15 )  PTT:41.0 sec    Urinalysis Basic - ( 09 Oct 2024 18:15 )    Color: x / Appearance: x / SG: x / pH: x  Gluc: 89 mg/dL / Ketone: x  / Bili: x / Urobili: x   Blood: x / Protein: x / Nitrite: x   Leuk Esterase: x / RBC: x / WBC x   Sq Epi: x / Non Sq Epi: x / Bacteria: x    < from: CT Angio Abd Aorta w/run-off w/ IV Cont (10.09.24 @ 22:52) >  FINDINGS:  Aorta: No aortic aneurysm. No aortic dissection.  Celiac trunk and mesenteric arteries: No occlusion or significant   stenosis.  Renal arteries: There is mild atherosclerotic narrowing of the left renal  artery proximally. There is mild atherosclerotic narrowing of the right   renal  artery proximally.  Right iliac arteries: No occlusion or significant stenosis.  Right femoral/popliteal arteries: Right popliteal artery is poorly  evaluated  due to artifact. No obvious occlusion.  Severe narrowing of the right   common  femoral artery proximally.  Right infrapopliteal arteries: There is severe narrowing of the right   posterior  tibial artery proximally.  Left iliac arteries: No occlusion or significant stenosis.  Left femoral/popliteal arteries: Left popliteal artery is poorly   evaluated due  to artifact. No obvious occlusion.  Left femoral artery is patent.  Left infrapopliteal arteries: There is severe narrowing of the left   posterior  tibial artery proximally.    Liver: No mass.  Gallbladder and biliary ducts: Unremarkable. No calcified stones. No   ductal  dilation.  Pancreas: Unremarkable. No mass. No ductal dilation.  Spleen: Normal. No splenomegaly.  Adrenal glands: Normal. No mass.  Kidneys and ureters: Normal. No mass.  Stomach and bowel: Unremarkable. No obstruction. No mucosal thickening.  Appendix: No evidence of appendicitis.  Urinary bladder: Unremarkable. No mass.  Reproductive: Unremarkable as visualized.  Intraperitoneal space: Unremarkable. No free air. No significant fluid  collection.  Lymph nodes: No lymphadenopathy.  Bones/joints: Right hip arthroplasty.  Soft tissues: There is a right groin pseudoaneurysm, measuring   approximately  3.0 x 1.7 cm. This arises from the bifurcation of the common femoral   artery.  Diffuse subcutaneous edema.      IMPRESSION:  1.   Right groin pseudoaneurysm, measuring up to approximately 3 cm in  diameter. No evidence for AV fistula.  2.   Severe narrowing of the bilateral posterior tibial arteries   proximally.  3.   Severe narrowing of the right common femoral artery proximally.    < end of copied text >    < from: US Duplex Venous Lower Ext Ltd, Right (10.09.24 @ 19:28) >    FINDINGS:    At the right groin, there is a partially thrombosed pseudoaneurysm   apparently arising from the common femoral artery, measuring 2.8 x 2.2 cm   with patent component measuring 1.5 x 1.0 cm. The neck measures 1.6 cm in   length and 0.4 cm width. Communication with the right common femoral vein  (AV fistula) cannot be excluded, image 1.41.    Right common femoral vein, femoral vein, and popliteal veins are patent,   with appropriate compressibility. Right calf veins and contralateral   common femoral vein are patent. There is edema of the superficial soft   tissues of the right lower extremity. Correlate clinically.    IMPRESSION:    Right CFA pseudoaneurysm with partial thrombosis. Associated AV fistula   to the right common femoral vein is not excluded, image 1.41. Recommend   multiphase CTA pelvis (precontrast, arterial and delayed phases) extended   through proximal thighs for further evaluation.    No acute DVT of the right lower extremity identified.    < end of copied text >

## 2024-10-15 LAB
CULTURE RESULTS: SIGNIFICANT CHANGE UP
SPECIMEN SOURCE: SIGNIFICANT CHANGE UP

## 2024-12-04 ENCOUNTER — RX RENEWAL (OUTPATIENT)
Age: 79
End: 2024-12-04